# Patient Record
Sex: FEMALE | Race: OTHER | HISPANIC OR LATINO | URBAN - METROPOLITAN AREA
[De-identification: names, ages, dates, MRNs, and addresses within clinical notes are randomized per-mention and may not be internally consistent; named-entity substitution may affect disease eponyms.]

---

## 2017-02-17 ENCOUNTER — OUTPATIENT (OUTPATIENT)
Dept: OUTPATIENT SERVICES | Facility: HOSPITAL | Age: 67
LOS: 1 days | End: 2017-02-17
Payer: COMMERCIAL

## 2017-02-17 PROCEDURE — G0202: CPT | Mod: 26

## 2017-03-17 ENCOUNTER — OUTPATIENT (OUTPATIENT)
Dept: OUTPATIENT SERVICES | Facility: HOSPITAL | Age: 67
LOS: 1 days | End: 2017-03-17
Payer: COMMERCIAL

## 2017-03-17 PROCEDURE — 76881 US COMPL JOINT R-T W/IMG: CPT | Mod: 26

## 2018-11-30 ENCOUNTER — OUTPATIENT (OUTPATIENT)
Dept: OUTPATIENT SERVICES | Facility: HOSPITAL | Age: 68
LOS: 1 days | End: 2018-11-30
Payer: COMMERCIAL

## 2018-11-30 PROCEDURE — 76770 US EXAM ABDO BACK WALL COMP: CPT | Mod: 26

## 2018-11-30 PROCEDURE — 76856 US EXAM PELVIC COMPLETE: CPT | Mod: 26

## 2019-01-26 ENCOUNTER — EMERGENCY (EMERGENCY)
Facility: HOSPITAL | Age: 69
LOS: 1 days | End: 2019-01-26
Payer: MEDICAID

## 2019-01-26 PROCEDURE — 99285 EMERGENCY DEPT VISIT HI MDM: CPT

## 2019-01-27 PROCEDURE — 74177 CT ABD & PELVIS W/CONTRAST: CPT | Mod: 26

## 2019-10-31 ENCOUNTER — APPOINTMENT (OUTPATIENT)
Dept: ANTEPARTUM | Facility: CLINIC | Age: 69
End: 2019-10-31
Payer: COMMERCIAL

## 2019-10-31 ENCOUNTER — ASOB RESULT (OUTPATIENT)
Age: 69
End: 2019-10-31

## 2019-10-31 PROBLEM — Z00.00 ENCOUNTER FOR PREVENTIVE HEALTH EXAMINATION: Status: ACTIVE | Noted: 2019-10-31

## 2019-10-31 PROCEDURE — 76856 US EXAM PELVIC COMPLETE: CPT | Mod: 59

## 2019-10-31 PROCEDURE — 76830 TRANSVAGINAL US NON-OB: CPT

## 2021-01-19 ENCOUNTER — APPOINTMENT (OUTPATIENT)
Dept: RADIOLOGY | Facility: CLINIC | Age: 71
End: 2021-01-19
Payer: COMMERCIAL

## 2021-01-19 PROCEDURE — 77080 DXA BONE DENSITY AXIAL: CPT

## 2022-12-13 ENCOUNTER — APPOINTMENT (OUTPATIENT)
Dept: ORTHOPEDIC SURGERY | Facility: CLINIC | Age: 72
End: 2022-12-13

## 2022-12-13 DIAGNOSIS — Z78.9 OTHER SPECIFIED HEALTH STATUS: ICD-10-CM

## 2022-12-13 DIAGNOSIS — Z98.890 OTHER SPECIFIED POSTPROCEDURAL STATES: ICD-10-CM

## 2022-12-13 PROCEDURE — 99204 OFFICE O/P NEW MOD 45 MIN: CPT

## 2022-12-13 PROCEDURE — 99072 ADDL SUPL MATRL&STAF TM PHE: CPT

## 2022-12-13 PROCEDURE — 73030 X-RAY EXAM OF SHOULDER: CPT | Mod: LT

## 2022-12-13 RX ORDER — MELOXICAM 7.5 MG/1
7.5 TABLET ORAL DAILY
Qty: 30 | Refills: 0 | Status: ACTIVE | COMMUNITY
Start: 2022-12-13 | End: 1900-01-01

## 2022-12-13 NOTE — IMAGING
[de-identified] : (Shoulder Examination)\par \par Laterality\par -Left\par \par General\par -In no acute distress: yes\par -Alert and oriented to person, place and time: yes\par -Lymphadenopathy: no\par -Peripheral edema: no\par -Sensation grossly intact to light touch: yes\par -Capillary refill less than 2 seconds: yes \par \par Inspection\par -Skin intact: yes\par -Swelling: no\par -Atrophy: no\par -Scapular winging: no\par -AC deformity: no\par -Biceps deformity: no\par \par Tenderness to Palpation\par -Bicipital groove: yes\par -AC joint: no\par -Scapulothoracic: no\par -Cervical paraspinal: no\par \par Range of Motion\par -Active forward elevation: 175\par -Passive forward elevation: 175\par -External rotation at the side: 80\par -Internal rotation behind the back: T7 \par -Cervical spine: normal\par \par Strength \par -Forward elevation: 5\par -External rotation at the side: 5\par -Internal rotation at the side: 5\par -Deltoid: 5\par \par Special Tests\par -Flores: positive \par -O’Frederick’s: negative\par -Speed’s: positive\par -Cross body adduction: negative\par -Apprehension and relocation: negative\par -Sulcus sign: negative\par -Belly press: negative\par -Spurling’s: negative\par \par  [Left] : left shoulder [There are no fractures, subluxations or dislocations. No significant abnormalities are seen] : There are no fractures, subluxations or dislocations. No significant abnormalities are seen [Type 2 acromion] : Type 2 acromion [FreeTextEntry1] : sclerosis and remodeling of the greater tuberosity likely secondary to chronic changes, no evidence of acute injury

## 2022-12-13 NOTE — WORK
[Sprain/Strain] : sprain/strain [Was the competent medical cause of the injury] : was the competent medical cause of the injury [Are consistent with the injury] : are consistent with the injury [Consistent with my objective findings] : consistent with my objective findings [Mild Partial] : mild partial [Reveals pre-existing condition(s) that may affect treatment/prognosis] : reveals pre-existing condition(s) that may affect treatment/prognosis [Can return to work with limitations on ______] : can return to work with limitations on [unfilled] [Climbing stairs/Ladders] : climbing stairs/ladders [Lifting] : lifting [Operating heavy equipment] : operating heavy equipment [Pulling/Pushing] : pulling/pushing [15+ days] : 15+ days [Patient] : patient [No Rx restrictions] : No Rx restrictions. [I provided the services listed above] :  I provided the services listed above. [FreeTextEntry1] : 50% [FreeTextEntry2] : rib injury [Sedentary Work:] : Sedentary Work: Exerting up to 10 pounds of force occasionally and/or a negligible amount of force frequently to lift, carry, push, pull or otherwise move objects, including the human body. Sedentary work involves sitting most of the time, but may involve walking or standing for brief periods of time. Jobs are sedentary if walking and standing are required only occasionally and all other sedentary criteria are met.

## 2022-12-13 NOTE — DISCUSSION/SUMMARY
[de-identified] : (Assessment)\par \par History, clinical examination and imaging were most consistent with:\par -left proximal biceps tendonitis\par \par The diagnosis was explained in detail. The potential non-surgical and surgical treatments were reviewed. The relative risks and benefits of each option were considered relative to the patient’s age, activity level, medical history, symptom severity and previously attempted treatments. \par \par -Non-surgical treatment was selected. The added clinical utility of an MRI was discussed. The patient deferred further diagnostic testing at this time to pursue non-surgical treatment. A cortisone injection was discussed and deferred. \par \par (Plan)\par \par -Activity modification\par -Physical therapy: script provided\par -Meloxicam: once daily with food, GI precautions discussed\par -Follow up: 6 weeks if failing to improve\par \par \par (MDM) \par \par Problem Complexity\par -Moderate: chronic illness with exacerbation\par \par Risk\par -Low: physical therapy\par -Moderate: prescription medication\par \par The patient was advised to consult with their primary medical provider prior to initiation of any new medications to reduce the risk of adverse effects specific to their long-term home medications and medical history. The risk of gastrointestinal irritation and kidney injury specific to long-term NSAID use was discussed.   \par \par

## 2022-12-13 NOTE — HISTORY OF PRESENT ILLNESS
[de-identified] : (New Visit)\par \par Patient Details\par -Age: 72\par -Occupation:   \par -Worker’s compensation claim: yes \par -No-fault claim: no\par -School injury claim: no\par \par  line [id number] [place]\par \par Symptom Details \par -Body part: LT shoulder \par -Duration of symptoms: DOI 11/3/22\par -How symptoms began:  She was outside taking care of the garden outside, slipped on water near the covered pool fell back torso and shoulder hitting the cement. Denies shoulder pain prior to the injury. She does not have work again until march. \par -Location of pain: anterior arm\par -Quality of pain: dull\par -Pain score at rest: 7/10\par -Pain score with activity: 9/10\par -Swelling: no\par -Stiffness: no\par -Radicular symptoms (numbness or radiating pain down extremity): runs down elbow\par \par \par Treatment Details \par -Activity modification: yes\par -Medication: tylenol sometimes \par -Physical therapy: no\par -Home exercise program: no\par -Injection: no\par -MRI: no\par \par Patient reports she also has a rib injury under care of another provider and was given 3 months off of work.

## 2023-01-05 ENCOUNTER — FORM ENCOUNTER (OUTPATIENT)
Age: 73
End: 2023-01-05

## 2023-01-24 ENCOUNTER — APPOINTMENT (OUTPATIENT)
Dept: ORTHOPEDIC SURGERY | Facility: CLINIC | Age: 73
End: 2023-01-24
Payer: OTHER MISCELLANEOUS

## 2023-01-24 PROCEDURE — 99213 OFFICE O/P EST LOW 20 MIN: CPT

## 2023-01-24 PROCEDURE — 99072 ADDL SUPL MATRL&STAF TM PHE: CPT

## 2023-01-24 NOTE — DISCUSSION/SUMMARY
[de-identified] : (Assessment)\par \par History, clinical examination and imaging were most consistent with:\par -left rotator cuff and biceps tendonitis\par \par The diagnosis was explained in detail. The potential non-surgical and surgical treatments were reviewed. The relative risks and benefits of each option were considered relative to the patient’s age, activity level, medical history, symptom severity and previously attempted treatments. \par \par -The patient chose to continue with non-surgical treatment.\par -Cortisone injection was discussed and deferred today.\par -Patient would like to complete her PT and transition to HEP. She defers additional PT beyond that. \par -Will have her follow up in 4-6 weeks to assess for return to work.\par -As patient is improving, will defer MRI at this time in favor of conservative treatment. \par \par \par (Plan)\par \par -Activity modification\par -Physical therapy: as per existing script\par -Ibuprofen: twice daily with food as needed for pain, GI precautions discussed\par -Follow up: 6 weeks \par \par \par (MDM) \par \par Problem Complexity\par -Moderate: chronic illness with exacerbation\par \par Risk\par -Low: physical therapy\par -Low: over the counter medication\par \par The patient was advised to consult with their primary medical provider prior to initiation of any new medications to reduce the risk of adverse effects specific to their long-term home medications and medical history. The risk of gastrointestinal irritation and kidney injury specific to long-term NSAID use was discussed.   \par \par

## 2023-01-24 NOTE — HISTORY OF PRESENT ILLNESS
[8] : 8 [5] : 5 [Intermittent] : intermittent [Sleep] : sleep [Meds] : meds [Exercising] : exercising [] : yes [FreeTextEntry1] : Lt shoulder [de-identified] : (Follow-Up Visit) \par \par Symptom Details\par -Body part: Lt shoulder \par -Pain score at rest: 5\par -Pain score with activity: 8\par -Improvement since last visit: feeling better with PT she would like to continue. reports transportation difficulties\par \par Interval Treatment Details\par -Activity modification: yes\par -Medication: Tylenol \par -Physical therapy: 3x a week\par -Home exercise program: yes\par -Injection: no\par -MRI: no\par \par \par Please refer to previous office visit notes for additional history.\par

## 2023-01-24 NOTE — WORK
[Sprain/Strain] : sprain/strain [Was the competent medical cause of the injury] : was the competent medical cause of the injury [Are consistent with the injury] : are consistent with the injury [Consistent with my objective findings] : consistent with my objective findings [Mild Partial] : mild partial [Reveals pre-existing condition(s) that may affect treatment/prognosis] : reveals pre-existing condition(s) that may affect treatment/prognosis [Climbing stairs/Ladders] : climbing stairs/ladders [Lifting] : lifting [Operating heavy equipment] : operating heavy equipment [Pulling/Pushing] : pulling/pushing [15+ days] : 15+ days [Patient] : patient [No Rx restrictions] : No Rx restrictions. [I provided the services listed above] :  I provided the services listed above. [Sedentary Work:] : Sedentary Work: Exerting up to 10 pounds of force occasionally and/or a negligible amount of force frequently to lift, carry, push, pull or otherwise move objects, including the human body. Sedentary work involves sitting most of the time, but may involve walking or standing for brief periods of time. Jobs are sedentary if walking and standing are required only occasionally and all other sedentary criteria are met. [Can return to work with limitations on ______] : can return to work with limitations on [unfilled] [FreeTextEntry1] : 50% [FreeTextEntry2] : rib injury

## 2023-01-24 NOTE — IMAGING
[de-identified] : (Shoulder Examination)\par \par Laterality\par -Left\par \par General\par -In no acute distress: yes\par -Alert and oriented to person, place and time: yes\par -Lymphadenopathy: no\par -Peripheral edema: no\par -Sensation grossly intact to light touch: yes\par -Capillary refill less than 2 seconds: yes \par \par Inspection\par -Skin intact: yes\par -Swelling: no\par -Atrophy: no\par -Scapular winging: no\par -AC deformity: no\par -Biceps deformity: no\par \par Tenderness to Palpation\par -Bicipital groove: yes\par -AC joint: no\par -Scapulothoracic: no\par -Cervical paraspinal: no\par \par Range of Motion\par -Active forward elevation: 175\par -Passive forward elevation: 175\par -External rotation at the side: 80\par -Internal rotation behind the back: T7 \par -Cervical spine: normal\par \par Strength \par -Forward elevation: 5\par -External rotation at the side: 5\par -Internal rotation at the side: 5\par -Deltoid: 5\par \par Special Tests\par -Flores: positive \par -O’Frederick’s: negative\par -Speed’s: positive \par -Cross body adduction: negative\par -Apprehension and relocation: negative\par -Sulcus sign: negative\par -Belly press: negative\par -Spurling’s: negative\par \par

## 2023-02-19 ENCOUNTER — FORM ENCOUNTER (OUTPATIENT)
Age: 73
End: 2023-02-19

## 2023-02-28 ENCOUNTER — APPOINTMENT (OUTPATIENT)
Dept: ORTHOPEDIC SURGERY | Facility: CLINIC | Age: 73
End: 2023-02-28
Payer: OTHER MISCELLANEOUS

## 2023-02-28 PROCEDURE — 99072 ADDL SUPL MATRL&STAF TM PHE: CPT

## 2023-02-28 PROCEDURE — 99213 OFFICE O/P EST LOW 20 MIN: CPT

## 2023-02-28 NOTE — IMAGING
[de-identified] : (Shoulder Examination)\par \par Laterality\par -Left\par \par General\par -In no acute distress: yes\par -Alert and oriented to person, place and time: yes\par -Lymphadenopathy: no\par -Peripheral edema: no\par -Sensation grossly intact to light touch: yes\par -Capillary refill less than 2 seconds: yes \par \par Inspection\par -Skin intact: yes\par -Swelling: no\par -Atrophy: no\par -Scapular winging: no\par -AC deformity: no\par -Biceps deformity: no\par \par Tenderness to Palpation\par -Bicipital groove: yes\par -AC joint: no\par -Scapulothoracic: no\par -Cervical paraspinal: no\par \par Range of Motion\par -Active forward elevation: 165\par -Passive forward elevation: 175\par -External rotation at the side: 80\par -Internal rotation behind the back: T7 \par -Cervical spine: normal\par \par Strength \par -Forward elevation: 5\par -External rotation at the side: 5\par -Internal rotation at the side: 5\par -Deltoid: 5\par \par Special Tests\par -Flores: positive \par -O’Frederick’s: negative\par -Speed’s: positive \par -Cross body adduction: negative\par -Apprehension and relocation: negative\par -Sulcus sign: negative\par -Belly press: negative\par -Spurling’s: negative\par

## 2023-02-28 NOTE — DISCUSSION/SUMMARY
[de-identified] : (Assessment)\par \par History, clinical examination and imaging were most consistent with:\par -left rotator cuff tendonitis versus tear and proximal biceps tendonitis\par \par The diagnosis was explained in detail. The potential non-surgical and surgical treatments were reviewed. The relative risks and benefits of each option were considered relative to the patient’s age, activity level, medical history, symptom severity and previously attempted treatments. \par \par -The patient has persistent symptoms despite conservative treatment. An MRI was therefore ordered to evaluate for structural abnormality and further guide treatment recommendations. In the interim, non-surgical treatment was selected.\par -We discussed that in the absence of a full thickness rotator cuff tear, we would proceed with an injection before consideration of any surgical intervention. \par -We will maintain current work status as sedentary duty pending the results of the MRI. \par -I recommended that she obtain further evaluation of her ribs from a general medical doctor or pulmonologist. \par -Follow up in 1 month to review imaging results and discuss further treatment options. \par \par \par (Plan)\par \par -Activity modification\par -MRI: script provided\par -Ibuprofen: twice daily with food as needed for pain, GI precautions discussed\par -Follow up: when imaging completed\par \par \par (MDM) \par \par Problem Complexity\par -Moderate: chronic illness with exacerbation\par \par Risk\par -Low: over the counter medication\par \par \par The patient was advised to consult with their primary medical provider prior to initiation of any new medications to reduce the risk of adverse effects specific to their long-term home medications and medical history. The risk of gastrointestinal irritation and kidney injury specific to long-term NSAID use was discussed.  \par \par

## 2023-02-28 NOTE — REVIEW OF SYSTEMS
[Negative] : Heme/Lymph Normal rate, regular rhythm.  Heart sounds S1, S2.  No murmurs, rubs or gallops.

## 2023-02-28 NOTE — HISTORY OF PRESENT ILLNESS
[de-identified] : (Follow-Up Visit) \par \par Symptom Details\par -Body part: LT shoulder \par -Pain score at rest: 3/10\par -Pain score with activity: 8/10\par -Improvement since last visit: physical therapy was helping, but she still has discomfort with certain motions. she has discomofrt with her rib\par \par Interval Treatment Details\par -Activity modification: no\par -Medication: tylenol \par -Physical therapy: finished 15 days ago. 3x a week \par -Home exercise program: yes\par -Injection: no\par -MRI: no\par \par \par Please refer to previous office visit notes for additional history.\par

## 2023-03-17 ENCOUNTER — FORM ENCOUNTER (OUTPATIENT)
Age: 73
End: 2023-03-17

## 2023-03-18 ENCOUNTER — APPOINTMENT (OUTPATIENT)
Dept: MRI IMAGING | Facility: CLINIC | Age: 73
End: 2023-03-18
Payer: OTHER MISCELLANEOUS

## 2023-03-18 ENCOUNTER — RESULT REVIEW (OUTPATIENT)
Age: 73
End: 2023-03-18

## 2023-03-18 PROCEDURE — 73221 MRI JOINT UPR EXTREM W/O DYE: CPT | Mod: LT

## 2023-03-28 ENCOUNTER — APPOINTMENT (OUTPATIENT)
Dept: ORTHOPEDIC SURGERY | Facility: CLINIC | Age: 73
End: 2023-03-28
Payer: OTHER MISCELLANEOUS

## 2023-03-28 PROCEDURE — 99214 OFFICE O/P EST MOD 30 MIN: CPT | Mod: 25

## 2023-03-28 PROCEDURE — 20610 DRAIN/INJ JOINT/BURSA W/O US: CPT | Mod: LT

## 2023-03-28 NOTE — DATA REVIEWED
[MRI] : MRI [Left] : left [Shoulder] : shoulder [Report was reviewed and noted in the chart] : The report was reviewed and noted in the chart [I reviewed the films/CD and agree] : I reviewed the films/CD and agree [FreeTextEntry1] : small full thickness tear of the supraspinatus without retraction, upper border subscapularis insertional tear, mild GH chondromalacia, biceps tendonitis without subluxation or rupture, type 2 acromion, no muscular atrophy

## 2023-03-28 NOTE — HISTORY OF PRESENT ILLNESS
[de-identified] : (Follow-Up Visit) \par \par Symptom Details\par -Body part: LT shoulder \par -Pain score at rest: 8/10\par -Pain score with activity: 9/10\par -Improvement since last visit:  No change, she recently underwent MRI. has not been back to work due to the shoulder pain\par \par Interval Treatment Details\par -Activity modification: no\par -Medication: tylenol prn \par -Physical therapy: finished\par -Home exercise program: yes\par -Injection: no\par -MRI: [facility] 3/18/23\par \par IMPRESSION:\par Full-thickness tear of the anterior to mid insertion of the supraspinatus tendon.\par Moderate tendinosis of the subscapularis tendon with intrasubstance tearing at the caudal insertional fibers.\par Moderate tendinosis of the intra-articular portion of the long head biceps tendon.\par Moderate acromioclavicular joint arthrosis. Subacromial/subdeltoid bursitis.\par \par  ID: [id number]\par  contact: [phone]\par \par Please refer to previous office visit notes for additional history.\par \par \par

## 2023-03-28 NOTE — IMAGING
[de-identified] : (Shoulder Examination)\par \par Laterality\par -Left\par \par General\par -In no acute distress: yes\par -Alert and oriented to person, place and time: yes\par -Lymphadenopathy: no\par -Peripheral edema: no\par -Sensation grossly intact to light touch: yes\par -Capillary refill less than 2 seconds: yes \par \par Inspection\par -Skin intact: yes\par -Swelling: no\par -Atrophy: no\par -Scapular winging: no\par -AC deformity: no\par -Biceps deformity: no\par \par Tenderness to Palpation\par -Bicipital groove: yes\par -AC joint: no\par -Scapulothoracic: no\par -Cervical paraspinal: no\par \par Range of Motion\par -Active forward elevation: 165\par -Passive forward elevation: 175\par -External rotation at the side: 80\par -Internal rotation behind the back: T7 \par -Cervical spine: normal\par \par Strength \par -Forward elevation: 5\par -External rotation at the side: 5\par -Internal rotation at the side: 5\par -Deltoid: 5\par \par Special Tests\par -Flores: positive \par -O’Frederick’s: negative\par -Speed’s: positive \par -Cross body adduction: negative\par -Apprehension and relocation: negative\par -Sulcus sign: negative\par -Belly press: negative\par -Spurling’s: negative\par

## 2023-03-28 NOTE — DISCUSSION/SUMMARY
[de-identified] : (Assessment and Plan)\par \par History, clinical examination and imaging were most consistent with:\par -left shoulder full thickness rotator cuff tear and biceps tendonitis\par \par The diagnosis was explained in detail. The potential non-surgical and surgical treatments were reviewed. The relative risks and benefits of each option were considered relative to the patient’s age, activity level, medical history, symptom severity and previously attempted treatments. \par \par -Non-surgical treatment was selected. \par -Patient defers additional PT and will proceed with a home exercise program. \par -Cortisone injection will be performed for symptom relief.\par -Over the counter NSAID as needed for pain. GI precautions discussed.\par -Work status will be light duty with no lifting greater than 10 pounds. \par -Follow up in 6 to 8 weeks to evaluate for updated work status. If symptoms fail to improve we would consider arthroscopic rotator cuff repair. The patient is hoping to avoid surgery. \par \par \par (MDM) \par \par Problem Complexity\par -Moderate: chronic illness with exacerbation\par \par Risk\par -Moderate: injection\par \par \par The patient was advised to consult with their primary medical provider prior to initiation of any new medications to reduce the risk of adverse effects specific to their long-term home medications and medical history. The risk of gastrointestinal irritation and kidney injury specific to long-term NSAID use was discussed.   \par \par (Procedure Note)\par \par Injection location was the:\par -left subacromial bursa\par \par Injection contents were: \par 40 mg of kenalog and 5 mL of 1% lidocaine\par \par Procedure Details: \par -Informed consent was obtained. Discussed possible risks of corticosteroid injection including hyperglycemia, infection and skin discoloration. \par -Discussed that due to infection risk, an interval between injection and any future surgery is 6 weeks for arthroscopy and 3 months for arthroplasty.\par -Injection performed using aseptic technique. Hemostasis was confirmed.\par -Procedure was tolerated well with no complications. \par \par

## 2023-03-28 NOTE — WORK
[Sprain/Strain] : sprain/strain [Was the competent medical cause of the injury] : was the competent medical cause of the injury [Are consistent with the injury] : are consistent with the injury [Consistent with my objective findings] : consistent with my objective findings [Mild Partial] : mild partial [Reveals pre-existing condition(s) that may affect treatment/prognosis] : reveals pre-existing condition(s) that may affect treatment/prognosis [Climbing stairs/Ladders] : climbing stairs/ladders [Lifting] : lifting [Operating heavy equipment] : operating heavy equipment [Pulling/Pushing] : pulling/pushing [15+ days] : 15+ days [Patient] : patient [No Rx restrictions] : No Rx restrictions. [I provided the services listed above] :  I provided the services listed above. [Can return to work with limitations on ______] : can return to work with limitations on [unfilled] [Light Work:] : Light Work: Exerting up to 20 pounds of force occasionally, and/or up to 10 pounds of force frequently and/or negligible amount of force constantly to move objects. Physical demand requirements are in excess of those for Sedentary Work. Even though the weight lifted may only be a negligible amount, a job should be rated Light Work: (1) when it requires walking or standing to a significant degree: or (2) when it requires sitting most of the time but entails pushing and/or pulling of arm or leg controls: and/or (3) when the job requires working at a production rate pace entailing the constant pushing and/or pulling of materials even though the weight of those materials is negligible. NOTE: The constant stress of maintaining a production rate pace, especially in an industrial setting, can be and is physically demanding of a worker even though the amount of force exerted is negligible. [FreeTextEntry1] : 50% [FreeTextEntry2] : rib injury

## 2023-05-09 ENCOUNTER — APPOINTMENT (OUTPATIENT)
Dept: ORTHOPEDIC SURGERY | Facility: CLINIC | Age: 73
End: 2023-05-09
Payer: OTHER MISCELLANEOUS

## 2023-05-09 PROCEDURE — 99213 OFFICE O/P EST LOW 20 MIN: CPT

## 2023-05-09 NOTE — DISCUSSION/SUMMARY
[de-identified] : (Assessment and Plan)\par \par History, clinical examination and imaging were most consistent with:\par -left shoulder full thickness rotator cuff tear and biceps tendonitis\par \par The diagnosis was explained in detail. The potential non-surgical and surgical treatments were reviewed. The relative risks and benefits of each option were considered relative to the patient’s age, activity level, medical history, symptom severity and previously attempted treatments. \par \par -Non-surgical treatment was selected as patient has been improving. \par -Patient will continue with a home exercise program. \par -Over the counter NSAID as needed for pain. GI precautions discussed.\par -Work status will remain light duty with no lifting greater than 10 pounds. \par -Follow up in 8 weeks to evaluate for MMI versus repeat cortisone injection. \par \par \par (MDM) \par \par Problem Complexity\par -Moderate: chronic illness with exacerbation\par \par Risk\par -Low: over the counter medication

## 2023-05-09 NOTE — WORK
[Sprain/Strain] : sprain/strain [Was the competent medical cause of the injury] : was the competent medical cause of the injury [Are consistent with the injury] : are consistent with the injury [Consistent with my objective findings] : consistent with my objective findings [Mild Partial] : mild partial [Reveals pre-existing condition(s) that may affect treatment/prognosis] : reveals pre-existing condition(s) that may affect treatment/prognosis [Climbing stairs/Ladders] : climbing stairs/ladders [Lifting] : lifting [Operating heavy equipment] : operating heavy equipment [Pulling/Pushing] : pulling/pushing [15+ days] : 15+ days [Patient] : patient [No Rx restrictions] : No Rx restrictions. [I provided the services listed above] :  I provided the services listed above. [Light Work:] : Light Work: Exerting up to 20 pounds of force occasionally, and/or up to 10 pounds of force frequently and/or negligible amount of force constantly to move objects. Physical demand requirements are in excess of those for Sedentary Work. Even though the weight lifted may only be a negligible amount, a job should be rated Light Work: (1) when it requires walking or standing to a significant degree: or (2) when it requires sitting most of the time but entails pushing and/or pulling of arm or leg controls: and/or (3) when the job requires working at a production rate pace entailing the constant pushing and/or pulling of materials even though the weight of those materials is negligible. NOTE: The constant stress of maintaining a production rate pace, especially in an industrial setting, can be and is physically demanding of a worker even though the amount of force exerted is negligible. [Can return to work with limitations on ______] : can return to work with limitations on [unfilled] [FreeTextEntry1] : 50% [FreeTextEntry2] : rib injury

## 2023-05-09 NOTE — IMAGING
[de-identified] : (Shoulder Examination)\par \par Laterality\par -Left\par \par General\par -In no acute distress: yes\par -Alert and oriented to person, place and time: yes\par -Lymphadenopathy: no\par -Peripheral edema: no\par -Sensation grossly intact to light touch: yes\par -Capillary refill less than 2 seconds: yes \par \par Inspection\par -Skin intact: yes\par -Swelling: no\par -Atrophy: no\par -Scapular winging: no\par -AC deformity: no\par -Biceps deformity: no\par \par Tenderness to Palpation\par -Bicipital groove: yes\par -AC joint: no\par -Scapulothoracic: no\par -Cervical paraspinal: no\par \par Range of Motion\par -Active forward elevation: 165\par -Passive forward elevation: 175\par -External rotation at the side: 80\par -Internal rotation behind the back: T7 \par -Cervical spine: normal\par \par Strength \par -Forward elevation: 5\par -External rotation at the side: 5\par -Internal rotation at the side: 5\par -Deltoid: 5\par \par Special Tests\par -Flores: positive \par -O’Frederick’s: negative\par -Speed’s: positive \par -Cross body adduction: negative\par -Apprehension and relocation: negative\par -Sulcus sign: negative\par -Belly press: negative\par -Spurling’s: negative\par

## 2023-05-09 NOTE — HISTORY OF PRESENT ILLNESS
[de-identified] : (History of Present Illness)\par \par Body part causing symptoms is the LT shoulder\par Pain score at rest is 0/10\par Pain score during activity is 0/10\par Treatments since last visit include cortisone injection and home exercises\par Compared to last visit, symptoms are a lot better \par She has been working light duty\par

## 2023-07-11 ENCOUNTER — APPOINTMENT (OUTPATIENT)
Dept: ORTHOPEDIC SURGERY | Facility: CLINIC | Age: 73
End: 2023-07-11
Payer: OTHER MISCELLANEOUS

## 2023-07-11 DIAGNOSIS — M75.82 OTHER SHOULDER LESIONS, LEFT SHOULDER: ICD-10-CM

## 2023-07-11 DIAGNOSIS — M75.122 COMPLETE ROTATOR CUFF TEAR OR RUPTURE OF LEFT SHOULDER, NOT SPECIFIED AS TRAUMATIC: ICD-10-CM

## 2023-07-11 DIAGNOSIS — M75.22 BICIPITAL TENDINITIS, LEFT SHOULDER: ICD-10-CM

## 2023-07-11 PROCEDURE — 99213 OFFICE O/P EST LOW 20 MIN: CPT

## 2023-07-11 NOTE — REASON FOR VISIT
Pt given discharge instructions. Pt anxious and upset that she 'wasted everyone's time\" Reassured pt it was good to come in and get checked out.   
[FreeTextEntry2] : LT shoulder pain WC DOI 11/03/2022

## 2023-07-11 NOTE — IMAGING
[de-identified] : (Physical Exam: Shoulder)\par \par Laterality is left\par \par Patient is in no acute distress, alert and oriented\par Sensation is grossly intact to light touch in the hand\par Motor function is 5/5 in the hand\par Capillary refill is less than 2 seconds in the fingers\par Lymphadenopathy is not present\par Peripheral edema is not present\par \par Skin is intact \par Swelling is not present \par Atrophy is not present\par Scapular winging is not present\par Deformity of the AC joint is not present\par Deformity of the biceps is not present \par \par Bicipital groove tenderness is not present \par AC joint tenderness is not present \par Scapulothoracic tenderness is not present \par Cervical paraspinal tenderness is not present \par \par Active forward elevation is 175\par Passive forward elevation is 175\par External rotation at the side is 80\par Internal rotation behind the back is to the level of T7 \par \par Forward elevation strength is 5-/5\par External rotation strength at the side is 5/5 \par Internal rotation strength at the side is 5/5 \par Deltoid strength of anterior, posterior and lateral heads is 5/5\par \par Flores test is abnormal \par O’Frederick’s test is abnormal\par Speed’s test is normal\par Cross body adduction test is normal\par Apprehension and relocation is normal\par Sulcus sign is normal\par Belly press test is normal\par Spurling’s test is normal\par

## 2023-07-11 NOTE — WORK
[Sprain/Strain] : sprain/strain [Was the competent medical cause of the injury] : was the competent medical cause of the injury [Are consistent with the injury] : are consistent with the injury [Consistent with my objective findings] : consistent with my objective findings [Mild Partial] : mild partial [Reveals pre-existing condition(s) that may affect treatment/prognosis] : reveals pre-existing condition(s) that may affect treatment/prognosis [Climbing stairs/Ladders] : climbing stairs/ladders [Lifting] : lifting [Operating heavy equipment] : operating heavy equipment [Pulling/Pushing] : pulling/pushing [15+ days] : 15+ days [Patient] : patient [No Rx restrictions] : No Rx restrictions. [I provided the services listed above] :  I provided the services listed above. [Light Work:] : Light Work: Exerting up to 20 pounds of force occasionally, and/or up to 10 pounds of force frequently and/or negligible amount of force constantly to move objects. Physical demand requirements are in excess of those for Sedentary Work. Even though the weight lifted may only be a negligible amount, a job should be rated Light Work: (1) when it requires walking or standing to a significant degree: or (2) when it requires sitting most of the time but entails pushing and/or pulling of arm or leg controls: and/or (3) when the job requires working at a production rate pace entailing the constant pushing and/or pulling of materials even though the weight of those materials is negligible. NOTE: The constant stress of maintaining a production rate pace, especially in an industrial setting, can be and is physically demanding of a worker even though the amount of force exerted is negligible. [Can return to work with limitations on ______] : can return to work with limitations on [unfilled] [FreeTextEntry2] : rib injury [Has the patient reached Maximum Medical Improvement? If yes, indicate date___] : Yes, on [unfilled] [FreeTextEntry1] : 7/11/2023

## 2023-07-11 NOTE — HISTORY OF PRESENT ILLNESS
[de-identified] : (History of Present Illness)\par \par Body part causing symptoms is the LT shoulder\par Pain score at rest is 0/10\par Pain score during activity is 2/10\par Treatments since last visit include  home exercises\par Compared to last visit, symptoms are a lot better \par She has been working light duty and is able to complete her duties without difficulty\par

## 2023-07-11 NOTE — DISCUSSION/SUMMARY
[de-identified] : (Assessment and Plan)\par \par History, clinical examination and imaging were most consistent with:\par -left shoulder full thickness rotator cuff tear and biceps tendonitis\par \par The diagnosis was explained in detail. The potential non-surgical and surgical treatments were reviewed. The relative risks and benefits of each option were considered relative to the patient’s age, activity level, medical history, symptom severity and previously attempted treatments. \par \par -Patient would like to continue with non-surgical treatment. \par -Patient will continue with a home exercise program. \par -Over the counter NSAID as needed for pain. GI precautions discussed.\par -Work status is recommended as light duty with no lifting greater than 10 pounds. \par -As patient's condition is stable, I would not recommend additional treatment at this time. Therefore, MMI reached today on 7/11/2023. \par -If [facility] evaluation is desired, can be scheduled at future date. \par -Follow up as needed. \par \par \par (MDM) \par \par Problem Complexity\par -Moderate: chronic illness with exacerbation\par \par Risk\par -Low: over the counter medication. \par  \par

## 2025-07-17 ENCOUNTER — NON-APPOINTMENT (OUTPATIENT)
Age: 75
End: 2025-07-17